# Patient Record
Sex: FEMALE | Race: WHITE | NOT HISPANIC OR LATINO | Employment: FULL TIME | URBAN - METROPOLITAN AREA
[De-identification: names, ages, dates, MRNs, and addresses within clinical notes are randomized per-mention and may not be internally consistent; named-entity substitution may affect disease eponyms.]

---

## 2023-04-07 ENCOUNTER — HOSPITAL ENCOUNTER (EMERGENCY)
Facility: CLINIC | Age: 38
Discharge: HOME | End: 2023-04-08
Attending: EMERGENCY MEDICINE | Admitting: EMERGENCY MEDICINE
Payer: COMMERCIAL

## 2023-04-07 DIAGNOSIS — R19.7 VOMITING AND DIARRHEA: ICD-10-CM

## 2023-04-07 DIAGNOSIS — R11.10 VOMITING AND DIARRHEA: ICD-10-CM

## 2023-04-07 LAB
BASOPHILS # BLD AUTO: 0 10E3/UL (ref 0–0.2)
BASOPHILS NFR BLD AUTO: 1 %
EOSINOPHIL # BLD AUTO: 0.2 10E3/UL (ref 0–0.7)
EOSINOPHIL NFR BLD AUTO: 4 %
ERYTHROCYTE [DISTWIDTH] IN BLOOD BY AUTOMATED COUNT: 12.1 % (ref 10–15)
HCT VFR BLD AUTO: 34.4 % (ref 35–47)
HGB BLD-MCNC: 11.8 G/DL (ref 11.7–15.7)
IMM GRANULOCYTES # BLD: 0 10E3/UL
IMM GRANULOCYTES NFR BLD: 0 %
LYMPHOCYTES # BLD AUTO: 1.2 10E3/UL (ref 0.8–5.3)
LYMPHOCYTES NFR BLD AUTO: 29 %
MCH RBC QN AUTO: 29.8 PG (ref 26.5–33)
MCHC RBC AUTO-ENTMCNC: 34.3 G/DL (ref 31.5–36.5)
MCV RBC AUTO: 87 FL (ref 78–100)
MONOCYTES # BLD AUTO: 0.5 10E3/UL (ref 0–1.3)
MONOCYTES NFR BLD AUTO: 13 %
NEUTROPHILS # BLD AUTO: 2.2 10E3/UL (ref 1.6–8.3)
NEUTROPHILS NFR BLD AUTO: 53 %
NRBC # BLD AUTO: 0 10E3/UL
NRBC BLD AUTO-RTO: 0 /100
PLATELET # BLD AUTO: 198 10E3/UL (ref 150–450)
RBC # BLD AUTO: 3.96 10E6/UL (ref 3.8–5.2)
WBC # BLD AUTO: 4.1 10E3/UL (ref 4–11)

## 2023-04-07 PROCEDURE — 83690 ASSAY OF LIPASE: CPT | Performed by: EMERGENCY MEDICINE

## 2023-04-07 PROCEDURE — 96360 HYDRATION IV INFUSION INIT: CPT

## 2023-04-07 PROCEDURE — 96361 HYDRATE IV INFUSION ADD-ON: CPT

## 2023-04-07 PROCEDURE — 87506 IADNA-DNA/RNA PROBE TQ 6-11: CPT | Performed by: EMERGENCY MEDICINE

## 2023-04-07 PROCEDURE — 80053 COMPREHEN METABOLIC PANEL: CPT | Performed by: EMERGENCY MEDICINE

## 2023-04-07 PROCEDURE — 87086 URINE CULTURE/COLONY COUNT: CPT | Performed by: EMERGENCY MEDICINE

## 2023-04-07 PROCEDURE — 85025 COMPLETE CBC W/AUTO DIFF WBC: CPT | Performed by: EMERGENCY MEDICINE

## 2023-04-07 PROCEDURE — 99283 EMERGENCY DEPT VISIT LOW MDM: CPT | Mod: 25

## 2023-04-07 PROCEDURE — 36415 COLL VENOUS BLD VENIPUNCTURE: CPT | Performed by: EMERGENCY MEDICINE

## 2023-04-07 PROCEDURE — 84703 CHORIONIC GONADOTROPIN ASSAY: CPT | Performed by: EMERGENCY MEDICINE

## 2023-04-07 PROCEDURE — 81001 URINALYSIS AUTO W/SCOPE: CPT | Performed by: EMERGENCY MEDICINE

## 2023-04-07 RX ORDER — SODIUM CHLORIDE, SODIUM LACTATE, POTASSIUM CHLORIDE, CALCIUM CHLORIDE 600; 310; 30; 20 MG/100ML; MG/100ML; MG/100ML; MG/100ML
INJECTION, SOLUTION INTRAVENOUS CONTINUOUS
Status: DISCONTINUED | OUTPATIENT
Start: 2023-04-08 | End: 2023-04-08 | Stop reason: HOSPADM

## 2023-04-07 RX ORDER — ONDANSETRON 2 MG/ML
4 INJECTION INTRAMUSCULAR; INTRAVENOUS EVERY 30 MIN PRN
Status: DISCONTINUED | OUTPATIENT
Start: 2023-04-07 | End: 2023-04-08 | Stop reason: HOSPADM

## 2023-04-07 ASSESSMENT — ENCOUNTER SYMPTOMS
DIARRHEA: 1
VOMITING: 1
FEVER: 1
MYALGIAS: 1
NAUSEA: 1

## 2023-04-08 ENCOUNTER — TELEPHONE (OUTPATIENT)
Dept: EMERGENCY MEDICINE | Facility: CLINIC | Age: 38
End: 2023-04-08
Payer: COMMERCIAL

## 2023-04-08 VITALS
HEART RATE: 76 BPM | RESPIRATION RATE: 13 BRPM | BODY MASS INDEX: 22.73 KG/M2 | TEMPERATURE: 98.5 F | WEIGHT: 150 LBS | SYSTOLIC BLOOD PRESSURE: 101 MMHG | HEIGHT: 68 IN | OXYGEN SATURATION: 97 % | DIASTOLIC BLOOD PRESSURE: 68 MMHG

## 2023-04-08 LAB
ALBUMIN SERPL BCG-MCNC: 4.4 G/DL (ref 3.5–5.2)
ALBUMIN UR-MCNC: 10 MG/DL
ALP SERPL-CCNC: 41 U/L (ref 35–104)
ALT SERPL W P-5'-P-CCNC: 95 U/L (ref 10–35)
ANION GAP SERPL CALCULATED.3IONS-SCNC: 8 MMOL/L (ref 7–15)
APPEARANCE UR: CLEAR
AST SERPL W P-5'-P-CCNC: 77 U/L (ref 10–35)
BACTERIA #/AREA URNS HPF: ABNORMAL /HPF
BILIRUB SERPL-MCNC: 0.4 MG/DL
BILIRUB UR QL STRIP: NEGATIVE
BUN SERPL-MCNC: 11 MG/DL (ref 6–20)
C COLI+JEJUNI+LARI FUSA STL QL NAA+PROBE: NOT DETECTED
CALCIUM SERPL-MCNC: 8.8 MG/DL (ref 8.6–10)
CHLORIDE SERPL-SCNC: 109 MMOL/L (ref 98–107)
COLOR UR AUTO: YELLOW
CREAT SERPL-MCNC: 0.81 MG/DL (ref 0.51–0.95)
DEPRECATED HCO3 PLAS-SCNC: 21 MMOL/L (ref 22–29)
EC STX1 GENE STL QL NAA+PROBE: NOT DETECTED
EC STX2 GENE STL QL NAA+PROBE: NOT DETECTED
GFR SERPL CREATININE-BSD FRML MDRD: >90 ML/MIN/1.73M2
GLUCOSE SERPL-MCNC: 93 MG/DL (ref 70–99)
GLUCOSE UR STRIP-MCNC: NEGATIVE MG/DL
HCG SERPL QL: NEGATIVE
HGB UR QL STRIP: ABNORMAL
KETONES UR STRIP-MCNC: NEGATIVE MG/DL
LEUKOCYTE ESTERASE UR QL STRIP: NEGATIVE
LIPASE SERPL-CCNC: 38 U/L (ref 13–60)
MUCOUS THREADS #/AREA URNS LPF: PRESENT /LPF
NITRATE UR QL: NEGATIVE
NOROV GI+II ORF1-ORF2 JNC STL QL NAA+PR: NOT DETECTED
PH UR STRIP: 5.5 [PH] (ref 5–7)
POTASSIUM SERPL-SCNC: 3.9 MMOL/L (ref 3.4–5.3)
PROT SERPL-MCNC: 6.8 G/DL (ref 6.4–8.3)
RBC URINE: 4 /HPF
RVA NSP5 STL QL NAA+PROBE: DETECTED
SALMONELLA SP RPOD STL QL NAA+PROBE: NOT DETECTED
SHIGELLA SP+EIEC IPAH STL QL NAA+PROBE: NOT DETECTED
SODIUM SERPL-SCNC: 138 MMOL/L (ref 136–145)
SP GR UR STRIP: 1.02 (ref 1–1.03)
SQUAMOUS EPITHELIAL: 3 /HPF
TRANSITIONAL EPI: <1 /HPF
UROBILINOGEN UR STRIP-MCNC: NORMAL MG/DL
V CHOL+PARA RFBL+TRKH+TNAA STL QL NAA+PR: NOT DETECTED
WBC URINE: 11 /HPF
Y ENTERO RECN STL QL NAA+PROBE: NOT DETECTED

## 2023-04-08 PROCEDURE — 258N000003 HC RX IP 258 OP 636: Performed by: EMERGENCY MEDICINE

## 2023-04-08 RX ORDER — ONDANSETRON 4 MG/1
4 TABLET, ORALLY DISINTEGRATING ORAL EVERY 8 HOURS PRN
Qty: 10 TABLET | Refills: 0 | Status: SHIPPED | OUTPATIENT
Start: 2023-04-08

## 2023-04-08 RX ADMIN — SODIUM CHLORIDE, POTASSIUM CHLORIDE, SODIUM LACTATE AND CALCIUM CHLORIDE 1000 ML: 600; 310; 30; 20 INJECTION, SOLUTION INTRAVENOUS at 01:14

## 2023-04-08 RX ADMIN — SODIUM CHLORIDE, POTASSIUM CHLORIDE, SODIUM LACTATE AND CALCIUM CHLORIDE 1000 ML: 600; 310; 30; 20 INJECTION, SOLUTION INTRAVENOUS at 00:08

## 2023-04-08 ASSESSMENT — ACTIVITIES OF DAILY LIVING (ADL): ADLS_ACUITY_SCORE: 35

## 2023-04-08 NOTE — TELEPHONE ENCOUNTER
"[facility] Emergency Department/Urgent Care Lab result notification  [Note:  ED Lab Results RN will reference the [facility] Emergency Dept visit note prior to contacting patient AND/OR prior to consulting Emergency Dept Provider.  Highlights of Emergency Dept visit in information summary at the bottom of this telephone note]    1. Reason for call    Notify of lab results    Assess patient symptoms [if necessary]    Review ED Providers recommendations/discharge instructions (if necessary)    Advise per [facility] ED lab result protocol    2. Lab Result (including Rx patient on, if applicable).  If culture, copy of lab report at bottom.  Component      Latest Ref Rng 4/7/2023   Campylobacter group by SARAH      Not Detected  Not Detected    Salmonella species by SARAH      Not Detected  Not Detected    Shigella species by SARAH      Not Detected  Not Detected    Vibrio group by SARAH      Not Detected  Not Detected    Rotavirus A by SARAH      Not Detected  Detected !    Shiga toxin 1 gene by SARAH      Not Detected  Not Detected    Shiga toxin 2 gene by SARAH      Not Detected  Not Detected    Norovirus I and II by SARAH      Not Detected  Not Detected    Yersinia enterocolitica by SARAH      Not Detected  Not Detected       3. RN Assessment (Patient's current Symptoms):    Time of call: 5::15P    Assessment: \"Just a little tired and achy but the nausea, vomting and diarrhea has subsided\"    4. RN Recommendations/Instructions per [facility] ED lab result protocol    [facility] ED lab result protocol used: Rotavirus    Sharona notified of lab result and treatment recommendations (Yes/No): Yes    RN reviewed information about Rotavrus.      5. Please Contact your PCP clinic or return to the Emergency department if your:    Symptoms return.    Symptoms worsen or other concerning symptoms.    Gus Kothari RN  [facility]  Emergency Dept Lab Result RN  Ph# [phone]        "

## 2023-04-08 NOTE — ED TRIAGE NOTES
Nausea, vomiting, diarrhea for 3 days. Last imodium 3 hrs ago.     Triage Assessment     Row Name 04/07/23 5264       Triage Assessment (Adult)    Airway WDL WDL       Respiratory WDL    Respiratory WDL WDL       Skin Circulation/Temperature WDL    Skin Circulation/Temperature WDL WDL       Cardiac WDL    Cardiac WDL WDL       Peripheral/Neurovascular WDL    Peripheral Neurovascular WDL WDL       Cognitive/Neuro/Behavioral WDL    Cognitive/Neuro/Behavioral WDL WDL

## 2023-04-08 NOTE — ED PROVIDER NOTES
"  History     Chief Complaint:  Nausea, Vomiting, & Diarrhea     HPI   Sharona Altamirano is a 37 year old female who presents with nausea, vomiting, and diarrhea over the last 3 days. The patient states she has a \"stomach virus\" and reports that both her son and daughter at home have been ill with similar symptoms over the past week (they're both now improved). A few days ago she developed nausea, vomiting, diarrhea, fever, and bodyaches. Today she thought she was on the mend, as she's had no ongoing vomiting, however since 1300 (11 hours ago) she's had \"nonstop\" diarrhea every 20 minutes. Patient states she has a doctor in the family who suspected she was dehydrated and recommended she be seen in ED for IV fluids, prompting her presentation at this time. Patient is otherwise healthy without pertinent past medical history or use of daily medication. S/p appendectomy. No known drug allergies.    Independent Historian:   None - Patient Only    ROS:  Review of Systems   Constitutional: Positive for fever.   Gastrointestinal: Positive for diarrhea, nausea and vomiting.   Musculoskeletal: Positive for myalgias.   All other systems reviewed and are negative.    Allergies:  No known drug allergies    Medications:    The patient takes no daily medication.    Past Medical History:    The patient denies any pertinent past medical history.    Past Surgical History:    Appendectomy   section    Social History:  The patient presented with a family member.  The patient arrived in a private vehicle.  PCP: System, Provider Not In     Physical Exam     Patient Vitals for the past 24 hrs:   BP Temp Temp src Pulse Resp SpO2 Height Weight   23 0232 -- -- -- -- -- 97 % -- --   23 0230 101/68 -- -- 76 -- -- -- --   23 2304 115/49 98.5  F (36.9  C) Oral 85 13 99 % 1.727 m (5' 8\") 68 kg (150 lb)      Physical Exam  General: Appears well-developed and well-nourished.   Head: No signs of trauma.   CV: Normal rate and " regular rhythm.    Resp: Effort normal and breath sounds normal. No respiratory distress.   GI: Soft. There is no tenderness.  No rebound or guarding.  Normal bowel sounds.  No CVA tenderness.  MSK: Normal range of motion.   Neuro: The patient is alert and oriented. Speech normal.  Skin: Skin is warm and dry. No rash noted.   Psych: normal mood and affect. behavior is normal.       Emergency Department Course     Laboratory:  Labs Ordered and Resulted from Time of ED Arrival to Time of ED Departure   ROUTINE UA WITH MICROSCOPIC REFLEX TO CULTURE - Abnormal       Result Value    Color Urine Yellow      Appearance Urine Clear      Glucose Urine Negative      Bilirubin Urine Negative      Ketones Urine Negative      Specific Gravity Urine 1.021      Blood Urine Moderate (*)     pH Urine 5.5      Protein Albumin Urine 10 (*)     Urobilinogen Urine Normal      Nitrite Urine Negative      Leukocyte Esterase Urine Negative      Bacteria Urine Few (*)     Mucus Urine Present (*)     RBC Urine 4 (*)     WBC Urine 11 (*)     Squamous Epithelials Urine 3 (*)     Transitional Epithelials Urine <1     COMPREHENSIVE METABOLIC PANEL - Abnormal    Sodium 138      Potassium 3.9      Chloride 109 (*)     Carbon Dioxide (CO2) 21 (*)     Anion Gap 8      Urea Nitrogen 11.0      Creatinine 0.81      Calcium 8.8      Glucose 93      Alkaline Phosphatase 41      AST 77 (*)     ALT 95 (*)     Protein Total 6.8      Albumin 4.4      Bilirubin Total 0.4      GFR Estimate >90     CBC WITH PLATELETS AND DIFFERENTIAL - Abnormal    WBC Count 4.1      RBC Count 3.96      Hemoglobin 11.8      Hematocrit 34.4 (*)     MCV 87      MCH 29.8      MCHC 34.3      RDW 12.1      Platelet Count 198      % Neutrophils 53      % Lymphocytes 29      % Monocytes 13      % Eosinophils 4      % Basophils 1      % Immature Granulocytes 0      NRBCs per 100 WBC 0      Absolute Neutrophils 2.2      Absolute Lymphocytes 1.2      Absolute Monocytes 0.5      Absolute  Eosinophils 0.2      Absolute Basophils 0.0      Absolute Immature Granulocytes 0.0      Absolute NRBCs 0.0     LIPASE - Normal    Lipase 38     HCG QUALITATIVE PREGNANCY - Normal    hCG Serum Qualitative Negative     ENTERIC BACTERIA AND VIRUS PANEL BY SARAH STOOL   URINE CULTURE      Emergency Department Course & Assessments:     Interventions:  Medications   lactated ringers BOLUS 1,000 mL (0 mLs Intravenous Stopped 4/8/23 0114)     Followed by   lactated ringers BOLUS 1,000 mL (0 mLs Intravenous Stopped 4/8/23 0150)     Followed by   lactated ringers infusion ( Intravenous Not Given 4/8/23 0232)   ondansetron (ZOFRAN) injection 4 mg (has no administration in time range)      Assessments:  2350 I obtained history and examined the patient as noted above.  0224 I rechecked the patient and explained findings. I believe that they are safe for discharge at this time.    Social Determinants of Health affecting care:   None    Disposition:  The patient was discharged to home.     Impression & Plan      Medical Decision Making:  Sharona Altamirano is a 37-year-old woman who presents due to vomiting and diarrhea.  She reports multiple people in the family have had the same symptoms.  She states that sheinitially had vomiting and then diarrhea over the last couple of days and going to the bathroom quite frequently.  She is concerned for dehydration.  Blood work was obtained that was overall reassuring.  The very slight elevation of her LFTs which would be consistent with vomiting.  She had a benign abdominal exam.  She was given IV fluids and Zofran and did feel better with this and was able to tolerate p.o.  In this setting I felt that she is appropriate for discharge home with recommended to continue with Zofran and Imodium and pushing fluids as she is able.  Stool studies are pending, although I did discuss that typically even if it does show something the recommendation is continued supportive care.    Diagnosis:     ICD-10-CM    1. Vomiting and diarrhea  R11.10     R19.7          Discharge Medications:  Discharge Medication List as of 4/8/2023  2:29 AM      START taking these medications    Details   ondansetron (ZOFRAN ODT) 4 MG ODT tab Take 1 tablet (4 mg) by mouth every 8 hours as needed for nausea, Disp-10 tablet, R-0, Local Print            Scribe Disclosure:  I, Diana Valente, am serving as a scribe at 11:56 PM on 4/7/2023 to document services personally performed by Tl Sevilla MD based on my observations and the provider's statements to me.     4/7/2023   Tl Sevilla MD Bergenstal, John A, MD  04/08/23 0434

## 2023-04-08 NOTE — TELEPHONE ENCOUNTER
[facility] Emergency Department/Urgent Care Lab result notification:    Reason for call    Notify the patient/parent of lab results    Assess patient symptoms    Review ED providers recommendations/discharge instructions (if necessary)    Advise per [facility] ED lab result protocol    Lab result  Final Enteric Bacteria and Virus Panel by SARAH Stool is POSITIVE for Rotavirus  Recommendations in treatment per [facility] ED Lab Result Enteric Bacteria and Virus Panel protocol.  1:22p  Sent [employer] information as well, result was viewed.  Left voicemail message requesting a call back to [phone] between 9 a.m. and 5:30 p.m. for patient's ED/UC lab results.      Bettie Thompson, DARCI  [facility] Service [facility] Result RN  [facility] Emergency Dept Lab Result RN  # [phone]

## 2023-04-09 LAB — BACTERIA UR CULT: NORMAL

## 2023-05-21 ENCOUNTER — HEALTH MAINTENANCE LETTER (OUTPATIENT)
Age: 38
End: 2023-05-21

## 2024-07-28 ENCOUNTER — HEALTH MAINTENANCE LETTER (OUTPATIENT)
Age: 39
End: 2024-07-28

## 2024-12-14 ENCOUNTER — HEALTH MAINTENANCE LETTER (OUTPATIENT)
Age: 39
End: 2024-12-14

## 2025-08-10 ENCOUNTER — HEALTH MAINTENANCE LETTER (OUTPATIENT)
Age: 40
End: 2025-08-10